# Patient Record
Sex: MALE | Race: WHITE
[De-identification: names, ages, dates, MRNs, and addresses within clinical notes are randomized per-mention and may not be internally consistent; named-entity substitution may affect disease eponyms.]

---

## 2019-10-11 ENCOUNTER — APPOINTMENT (OUTPATIENT)
Dept: OTOLARYNGOLOGY | Facility: CLINIC | Age: 39
End: 2019-10-11
Payer: COMMERCIAL

## 2019-10-11 VITALS
HEIGHT: 73 IN | SYSTOLIC BLOOD PRESSURE: 144 MMHG | WEIGHT: 200 LBS | BODY MASS INDEX: 26.51 KG/M2 | HEART RATE: 64 BPM | DIASTOLIC BLOOD PRESSURE: 95 MMHG

## 2019-10-11 DIAGNOSIS — J31.2 CHRONIC PHARYNGITIS: ICD-10-CM

## 2019-10-11 DIAGNOSIS — K21.9 GASTRO-ESOPHAGEAL REFLUX DISEASE W/OUT ESOPHAGITIS: ICD-10-CM

## 2019-10-11 DIAGNOSIS — Z80.3 FAMILY HISTORY OF MALIGNANT NEOPLASM OF BREAST: ICD-10-CM

## 2019-10-11 PROCEDURE — 31575 DIAGNOSTIC LARYNGOSCOPY: CPT

## 2019-10-11 PROCEDURE — 99213 OFFICE O/P EST LOW 20 MIN: CPT | Mod: 25

## 2019-10-11 RX ORDER — SERTRALINE 25 MG/1
TABLET, FILM COATED ORAL
Refills: 0 | Status: ACTIVE | COMMUNITY

## 2019-10-11 NOTE — CONSULT LETTER
[Dear  ___] : Dear  [unfilled], [Courtesy Letter:] : I had the pleasure of seeing your patient, [unfilled], in my office today. [Please see my note below.] : Please see my note below. [Consult Closing:] : Thank you very much for allowing me to participate in the care of this patient.  If you have any questions, please do not hesitate to contact me. [Sincerely,] : Sincerely, [FreeTextEntry3] : Arcelia Rojas MD\par

## 2019-10-11 NOTE — ASSESSMENT
[FreeTextEntry1] : He has a 2 month history of a clicking sensation when he swallows. There were no suspicious masses or lesions on exam but he did have reflux related laryngeal changes. He said the clicking could be from gulping.  It could also be  due to movement of the larynx\par \par PLAN\par \par -findings and management options discussed in detail with the patient. \par - Waterpik and saltwater gargles prn\par - Reflux precautions and elevation of the head of bed at night.  Literature given.\par - Trial of pepcid\par - GI evaluation recommended.\par - follow up in in approximately 3 weeks. If the clicking is not improving, will discuss obtaining an imaging study and further work up\par - call and return earlier if any concerns

## 2019-10-11 NOTE — HISTORY OF PRESENT ILLNESS
[de-identified] : RAKESH MCKAY is a 38 year patient With a history of, tonsil stones. He is here with a clicking sensation in his larynx when he swallows food for the past 2 months. He has a swollen gland sensation. He denies throat pain, voice change or bleeding. It is unclear if he could have reflux. He uses a WaterPik as needed for the tonsil stones.